# Patient Record
Sex: FEMALE | Race: BLACK OR AFRICAN AMERICAN | NOT HISPANIC OR LATINO | Employment: OTHER | URBAN - METROPOLITAN AREA
[De-identification: names, ages, dates, MRNs, and addresses within clinical notes are randomized per-mention and may not be internally consistent; named-entity substitution may affect disease eponyms.]

---

## 2018-04-16 PROBLEM — M67.431 GANGLION CYST OF VOLAR ASPECT OF RIGHT WRIST: Status: ACTIVE | Noted: 2018-04-16

## 2018-04-19 PROBLEM — K80.20 GALLSTONES: Status: ACTIVE | Noted: 2018-04-19

## 2018-04-19 PROBLEM — K29.60 REFLUX GASTRITIS: Status: ACTIVE | Noted: 2018-04-19

## 2021-02-23 ENCOUNTER — TELEPHONE (OUTPATIENT)
Dept: PODIATRY | Facility: CLINIC | Age: 66
End: 2021-02-23

## 2021-07-26 PROBLEM — Z78.0 POSTMENOPAUSAL: Status: ACTIVE | Noted: 2021-07-26

## 2021-07-26 PROBLEM — F41.9 ANXIETY: Status: ACTIVE | Noted: 2021-07-26

## 2021-07-26 PROBLEM — E66.9 OBESITY (BMI 35.0-39.9 WITHOUT COMORBIDITY): Status: ACTIVE | Noted: 2021-07-26

## 2021-07-26 PROBLEM — E78.5 HYPERLIPIDEMIA: Status: ACTIVE | Noted: 2021-07-26

## 2022-06-23 PROBLEM — E66.01 SEVERE OBESITY (BMI 35.0-39.9) WITH COMORBIDITY: Status: ACTIVE | Noted: 2022-06-23

## 2022-06-23 PROBLEM — I10 HYPERTENSION: Status: ACTIVE | Noted: 2022-06-23

## 2022-08-31 ENCOUNTER — IMMUNIZATION (OUTPATIENT)
Dept: FAMILY MEDICINE | Facility: CLINIC | Age: 67
End: 2022-08-31
Payer: MEDICARE

## 2022-08-31 DIAGNOSIS — Z23 NEED FOR VACCINATION: Primary | ICD-10-CM

## 2022-08-31 PROCEDURE — 91305 COVID-19, MRNA, LNP-S, PF, 30 MCG/0.3 ML DOSE VACCINE (PFIZER): CPT | Mod: PBBFAC | Performed by: FAMILY MEDICINE

## 2023-10-26 DIAGNOSIS — M79.671 BILATERAL FOOT PAIN: Primary | ICD-10-CM

## 2023-10-26 DIAGNOSIS — M79.672 BILATERAL FOOT PAIN: Primary | ICD-10-CM

## 2023-10-27 ENCOUNTER — HOSPITAL ENCOUNTER (OUTPATIENT)
Dept: RADIOLOGY | Facility: HOSPITAL | Age: 68
Discharge: HOME | End: 2023-10-27
Attending: ORTHOPAEDIC SURGERY
Payer: MEDICARE

## 2023-10-27 ENCOUNTER — OFFICE VISIT (OUTPATIENT)
Dept: ORTHOPEDICS | Facility: CLINIC | Age: 68
End: 2023-10-27
Payer: MEDICARE

## 2023-10-27 VITALS — HEIGHT: 67 IN | BODY MASS INDEX: 38.45 KG/M2 | WEIGHT: 245 LBS

## 2023-10-27 DIAGNOSIS — M76.822 POSTERIOR TIBIAL TENDON DYSFUNCTION (PTTD) OF LEFT LOWER EXTREMITY: ICD-10-CM

## 2023-10-27 DIAGNOSIS — M79.672 BILATERAL FOOT PAIN: ICD-10-CM

## 2023-10-27 DIAGNOSIS — M79.672 LEFT FOOT PAIN: ICD-10-CM

## 2023-10-27 DIAGNOSIS — M76.822 LEFT TIBIALIS POSTERIOR TENDONITIS: Primary | ICD-10-CM

## 2023-10-27 DIAGNOSIS — M79.671 BILATERAL FOOT PAIN: ICD-10-CM

## 2023-10-27 PROCEDURE — 73630 X-RAY EXAM OF FOOT: CPT | Mod: 26,LT,, | Performed by: RADIOLOGY

## 2023-10-27 PROCEDURE — 3288F FALL RISK ASSESSMENT DOCD: CPT | Mod: CPTII,S$GLB,, | Performed by: ORTHOPAEDIC SURGERY

## 2023-10-27 PROCEDURE — 1125F PR PAIN SEVERITY QUANTIFIED, PAIN PRESENT: ICD-10-PCS | Mod: CPTII,S$GLB,, | Performed by: ORTHOPAEDIC SURGERY

## 2023-10-27 PROCEDURE — 99204 OFFICE O/P NEW MOD 45 MIN: CPT | Mod: 25,S$GLB,, | Performed by: ORTHOPAEDIC SURGERY

## 2023-10-27 PROCEDURE — 97760 ORTHOTIC MGMT&TRAING 1ST ENC: CPT | Mod: GP,S$GLB,97, | Performed by: ORTHOPAEDIC SURGERY

## 2023-10-27 PROCEDURE — 3008F BODY MASS INDEX DOCD: CPT | Mod: CPTII,S$GLB,, | Performed by: ORTHOPAEDIC SURGERY

## 2023-10-27 PROCEDURE — 99999 PR PBB SHADOW E&M-EST. PATIENT-LVL III: CPT | Mod: PBBFAC,,, | Performed by: ORTHOPAEDIC SURGERY

## 2023-10-27 PROCEDURE — 1101F PR PT FALLS ASSESS DOC 0-1 FALLS W/OUT INJ PAST YR: ICD-10-PCS | Mod: CPTII,S$GLB,, | Performed by: ORTHOPAEDIC SURGERY

## 2023-10-27 PROCEDURE — 73630 XR FOOT COMPLETE 3 VIEW LEFT: ICD-10-PCS | Mod: 26,LT,, | Performed by: RADIOLOGY

## 2023-10-27 PROCEDURE — 99204 PR OFFICE/OUTPT VISIT, NEW, LEVL IV, 45-59 MIN: ICD-10-PCS | Mod: 25,S$GLB,, | Performed by: ORTHOPAEDIC SURGERY

## 2023-10-27 PROCEDURE — 73610 X-RAY EXAM OF ANKLE: CPT | Mod: 26,LT,, | Performed by: RADIOLOGY

## 2023-10-27 PROCEDURE — 73610 XR ANKLE COMPLETE 3 VIEW LEFT: ICD-10-PCS | Mod: 26,LT,, | Performed by: RADIOLOGY

## 2023-10-27 PROCEDURE — 3008F PR BODY MASS INDEX (BMI) DOCUMENTED: ICD-10-PCS | Mod: CPTII,S$GLB,, | Performed by: ORTHOPAEDIC SURGERY

## 2023-10-27 PROCEDURE — 99999 PR PBB SHADOW E&M-EST. PATIENT-LVL III: ICD-10-PCS | Mod: PBBFAC,,, | Performed by: ORTHOPAEDIC SURGERY

## 2023-10-27 PROCEDURE — 73610 X-RAY EXAM OF ANKLE: CPT | Mod: TC,PO,LT

## 2023-10-27 PROCEDURE — 3288F PR FALLS RISK ASSESSMENT DOCUMENTED: ICD-10-PCS | Mod: CPTII,S$GLB,, | Performed by: ORTHOPAEDIC SURGERY

## 2023-10-27 PROCEDURE — 73630 X-RAY EXAM OF FOOT: CPT | Mod: TC,PO,LT

## 2023-10-27 PROCEDURE — 97760 PR ORTHOTIC MGMT&TRAINJ INITIAL ENC EA 15 MINS: ICD-10-PCS | Mod: GP,S$GLB,97, | Performed by: ORTHOPAEDIC SURGERY

## 2023-10-27 PROCEDURE — 1159F PR MEDICATION LIST DOCUMENTED IN MEDICAL RECORD: ICD-10-PCS | Mod: CPTII,S$GLB,, | Performed by: ORTHOPAEDIC SURGERY

## 2023-10-27 PROCEDURE — 1160F RVW MEDS BY RX/DR IN RCRD: CPT | Mod: CPTII,S$GLB,, | Performed by: ORTHOPAEDIC SURGERY

## 2023-10-27 PROCEDURE — 1125F AMNT PAIN NOTED PAIN PRSNT: CPT | Mod: CPTII,S$GLB,, | Performed by: ORTHOPAEDIC SURGERY

## 2023-10-27 PROCEDURE — 1159F MED LIST DOCD IN RCRD: CPT | Mod: CPTII,S$GLB,, | Performed by: ORTHOPAEDIC SURGERY

## 2023-10-27 PROCEDURE — 1101F PT FALLS ASSESS-DOCD LE1/YR: CPT | Mod: CPTII,S$GLB,, | Performed by: ORTHOPAEDIC SURGERY

## 2023-10-27 PROCEDURE — 1160F PR REVIEW ALL MEDS BY PRESCRIBER/CLIN PHARMACIST DOCUMENTED: ICD-10-PCS | Mod: CPTII,S$GLB,, | Performed by: ORTHOPAEDIC SURGERY

## 2023-10-27 NOTE — PROGRESS NOTES
Status/Diagnosis: Left PCFD and posterior tibial tendonitis.  Date of Surgery: none  Date of Injury: none; DOO September 2023  Return visit: PRN  X-rays on Return: pending patient complaint    Chief Complaint:  Left ankle pain.    Present History:  Pilar Major is a 68 y.o. female who presents today for new patient evaluation.  Endorses a 1.5 month history of persistent left posteromedial ankle pain.  Denies any history of injury or other inciting event.  States she was simply woke up and noticed the pain.  Has tried over-the-counter oral ibuprofen with mild relief.  States that if she takes this for extended.  She has side effects like shortness of breath thus she takes only sparingly.  She was given a lace-up ankle brace.  She has worn this occasionally.  It does provide some relief.  Endorses a he/throbbing pain.  Denies any numbness or tingling.  Little to no pain at rest, increased with weight-bearing.    No history of physical therapy, corticosteroid injection, etc.   Also mentioned using topical steroids for short period over the area of interest.  While this did provide relief, patient was concerned about side effect profile thus self discontinued.  Past medical history significant for anemia and hyperlipidemia.  Denies tobacco use.    Past Medical History:   Diagnosis Date    Acrophobia     Anemia     Anxiety     Carpal tunnel syndrome     Dysthymia     Fatigue     Gallstones     Ganglion     Headache     Hyperlipemia     Hyperlipidemia     Lumbago     Oligomenorrhea     Panic attack     Pruritus     Seasonal allergies     Shoulder pain     Skin tag     Sleep apnea     Recent diagnosis, has not recieved machine yet    Snoring     Symptomatic menopausal or female climacteric states     Thyroid disorder     Varicose vein     Vitamin D deficiency        Past Surgical History:   Procedure Laterality Date    ANKLE SURGERY      2002 exc cyst right ankle    COLONOSCOPY  08/13/2020    Dr. Hudson    COLONOSCOPY  N/A 03/11/2022    Dr. Hudson    GANGLION CYST EXCISION Right 04/16/2018    right wrist & forearm       Current Outpatient Medications   Medication Sig    amLODIPine (NORVASC) 5 MG tablet Take 1 tablet (5 mg total) by mouth once daily.    cyclobenzaprine (FLEXERIL) 10 MG tablet TAKE 1 TABLET BY MOUTH THREE TIMES A DAY    diazePAM (VALIUM) 10 MG Tab Take 1 tablet by mouth daily as needed.    fluticasone propionate (FLONASE) 50 mcg/actuation nasal spray 1 spray (50 mcg total) by Each Nostril route once daily.    HYDROcodone-acetaminophen (NORCO)  mg per tablet Take 1 tablet by mouth every 24 hours as needed for Pain. Greater than 7 day supply medically necessary.    ibuprofen (ADVIL,MOTRIN) 800 MG tablet TAKE 1 TABLET BY MOUTH THREE TIMES A DAY    levocetirizine (XYZAL) 5 MG tablet Take 1 tablet (5 mg total) by mouth every evening.    phentermine 37.5 MG capsule Take 37.5 mg by mouth every morning.    pregabalin (LYRICA) 100 MG capsule Take 100 mg by mouth every evening.    rosuvastatin (CRESTOR) 10 MG tablet TAKE 1 TABLET BY MOUTH EVERY DAY     No current facility-administered medications for this visit.       Review of patient's allergies indicates:  No Known Allergies    Family History   Problem Relation Age of Onset    Heart failure Mother         CHF    Diabetes Mother     Hypertension Mother     Cancer Father         prostate     Hypertension Father     Diabetes Maternal Grandmother        Social History     Socioeconomic History    Marital status:    Tobacco Use    Smoking status: Never    Smokeless tobacco: Never   Substance and Sexual Activity    Alcohol use: No     Alcohol/week: 0.0 standard drinks of alcohol    Drug use: No    Sexual activity: Not Currently       Physical exam:  There were no vitals filed for this visit.  There is no height or weight on file to calculate BMI.  General: In no apparent distress; well developed and well nourished.  HEENT: normocephalic;  atraumatic.  Cardiovascular: regular rate.  Respiratory: no increased work of breathing.  Musculoskeletal:   Gait:  Mild antalgic  Inspection:   Patient with moderate bilateral flatfoot deformity with associated hindfoot valgus.  No significant forefoot abduction.  With provocation able to perform single limb heel rise on the right.  Significant pain and inability to perform single limb heel rise on the left.  Moderate fullness along the course of the posterior tibial tendon only on the left.  Pain localized to this site on deep palpation spanning from the musculotendinous junction distally to the tip of the medial malleolus.  Patient does not localize pain to the sinus tarsi but she does have moderate tenderness on deep palpation at this site also.    Hindfoot is for the most part passively correctable.  No laxity on anterior drawer testing.  No rina 1st TMT hypermobility.  Silfverskiold:  Positive  Alignment:  Knee: neutral               Ankle: neutral              Hindfoot:  Valgus              Forefoot: neutral   Strength:              Dorsiflexion 5/5  Plantar flexion 5/5  Inversion 4/5  Eversion 5/5   Sensation:              SILT distally   ROM:              Ankle and subtalar:  Near full with pain at extremes  Pulses: 2+ DP/PT pulses.                   Imaging Studies/Outside documentation:  I have ordered/reviewed/interpreted the following images/outside documentation:  1. Weight bearing 3-views of Left foot and ankle:   On my independent review, no acute bony abnormality noted.  Large enthesophyte at the Achilles insertion with adjacent Rogerio deformity.  Mild decreased calcaneal pitch with talonavicular uncoverage within normal limits.  Ankle mortise remains congruent.  No significant degenerative joint changes.  Second hammertoe.     Assessment:  Pilar Major is a 68 y.o. female with  Left PCFD and posterior tibial tendonitis.     Plan:   Clinical and radiographic findings were discussed.  Recommend  conservative management to include a short period of boot immobilization.  This is to be worn full-time while weight-bearing.  May remove otherwise.  We will also plan to initiate physical therapy with emphasis on modalities, specifically iontophoresis as patient reports that topical steroids have helped to some degree in the past.  Patient voiced understanding.  All questions were answered.  She will follow up on an as-needed basis.    Should symptoms persist or worsen, MRI left ankle without contrast for further evaluation.    We performed a custom orthotic/brace fitting, adjusting and training with the patient. The patient demonstrated understanding and proper care. This was performed for 15 minutes.      This note was created using voice recognition software and may contain grammatical errors.